# Patient Record
Sex: MALE | Race: WHITE | NOT HISPANIC OR LATINO | Employment: OTHER | URBAN - NONMETROPOLITAN AREA
[De-identification: names, ages, dates, MRNs, and addresses within clinical notes are randomized per-mention and may not be internally consistent; named-entity substitution may affect disease eponyms.]

---

## 2023-01-24 ENCOUNTER — HOSPITAL ENCOUNTER (EMERGENCY)
Facility: HOSPITAL | Age: 78
Discharge: HOME | End: 2023-01-24
Attending: EMERGENCY MEDICINE | Admitting: EMERGENCY MEDICINE

## 2023-01-24 VITALS
BODY MASS INDEX: 25.65 KG/M2 | OXYGEN SATURATION: 98 % | HEIGHT: 72 IN | WEIGHT: 189.38 LBS | HEART RATE: 79 BPM | SYSTOLIC BLOOD PRESSURE: 156 MMHG | DIASTOLIC BLOOD PRESSURE: 84 MMHG | RESPIRATION RATE: 18 BRPM | TEMPERATURE: 96.8 F

## 2023-01-24 DIAGNOSIS — K11.20 PAROTIDITIS: ICD-10-CM

## 2023-01-24 DIAGNOSIS — L50.9 URTICARIA: Primary | ICD-10-CM

## 2023-01-24 RX ORDER — FAMOTIDINE 20 MG/1
20 TABLET, FILM COATED ORAL 2 TIMES DAILY
Qty: 20 TABLET | Refills: 0 | Status: SHIPPED | OUTPATIENT
Start: 2023-01-24

## 2023-01-24 RX ORDER — DIPHENHYDRAMINE HCL 25 MG
25 TABLET ORAL ONCE
Status: COMPLETED | OUTPATIENT
Start: 2023-01-24 | End: 2023-01-24

## 2023-01-24 RX ORDER — DIPHENHYDRAMINE HCL 25 MG
25 TABLET ORAL EVERY 6 HOURS
Qty: 20 TABLET | Refills: 0 | Status: SHIPPED | OUTPATIENT
Start: 2023-01-24

## 2023-01-24 RX ORDER — FAMOTIDINE 20 MG/1
20 TABLET, FILM COATED ORAL ONCE
Status: COMPLETED | OUTPATIENT
Start: 2023-01-24 | End: 2023-01-24

## 2023-01-24 RX ADMIN — DIPHENHYDRAMINE HYDROCHLORIDE 25 MG: 25 TABLET ORAL at 16:56

## 2023-01-24 RX ADMIN — FAMOTIDINE 20 MG: 20 TABLET ORAL at 16:56

## 2023-01-24 NOTE — ED PROVIDER NOTES
History  Chief Complaint   Patient presents with   • Facial Swelling   • Rash     Pt c/o left side facial swelling starting 3 days ago w/rash developing on left side extremities moving to all extremities within past 2 days  Pt seen at other hospital 3 days ago and given abx  Pt c/o worsening sx today  Patient is a 41-year-old male presenting to the emergency department today complaining of itchy rash and left facial swelling, states he was seen at outside hospital yesterday and diagnosed with parotiditis, he is taking Augmentin and his facial swelling has actually improved significantly but reports they did not address the itchy rash that he is having for the past 3 days, he did spend the day at [facility] on Saturday, noted the rash when he woke up on Sunday, he did not spend the night, he did not have any new foods, lotions, detergents, or any other allergen triggers, he denies any shortness of breath, no difficulty swallowing, no history of similar symptoms previously, he has a catch but reports the cat does not have fleas, he has not taken anything for the rash thus far          None       History reviewed  No pertinent past medical history  History reviewed  No pertinent surgical history  History reviewed  No pertinent family history  I have reviewed and agree with the history as documented  E-Cigarette/Vaping   • E-Cigarette Use Never User      E-Cigarette/Vaping Substances     Social History     Tobacco Use   • Smoking status: Never   • Smokeless tobacco: Never   Vaping Use   • Vaping Use: Never used   Substance Use Topics   • Alcohol use: Not Currently   • Drug use: Never       Review of Systems   Constitutional: Negative  HENT: Positive for facial swelling  Eyes: Negative  Respiratory: Negative  Cardiovascular: Negative  Gastrointestinal: Negative  Endocrine: Negative  Genitourinary: Negative  Musculoskeletal: Negative  Skin: Positive for rash  Allergic/Immunologic: Negative  Neurological: Negative  Hematological: Negative  Psychiatric/Behavioral: Negative  Physical Exam  Physical Exam  Constitutional:       Appearance: He is well-developed  HENT:      Head: Normocephalic and atraumatic  Comments: Left facial swelling, left parotid enlarged, airway is patent, no stridor  Eyes:      Conjunctiva/sclera: Conjunctivae normal       Pupils: Pupils are equal, round, and reactive to light  Cardiovascular:      Rate and Rhythm: Normal rate  Pulmonary:      Effort: Pulmonary effort is normal    Abdominal:      Palpations: Abdomen is soft  Musculoskeletal:         General: Normal range of motion  Cervical back: Normal range of motion and neck supple  Skin:     General: Skin is warm and dry  Comments: Erythematous indurated lesions, mainly on the trunk with scattered lesions on the upper and lower extremities, see attached photos for details   Neurological:      Mental Status: He is alert and oriented to person, place, and time                       Vital Signs  ED Triage Vitals [01/24/23 1623]   Temperature Pulse Respirations Blood Pressure SpO2   (!) 96 8 °F (36 °C) 79 18 156/84 98 %      Temp Source Heart Rate Source Patient Position - Orthostatic VS BP Location FiO2 (%)   Tympanic Monitor Lying Right arm --      Pain Score       5           Vitals:    01/24/23 1623   BP: 156/84   Pulse: 79   Patient Position - Orthostatic VS: Lying         Visual Acuity      ED Medications  Medications   famotidine (PEPCID) tablet 20 mg (20 mg Oral Given 1/24/23 1656)   diphenhydrAMINE (BENADRYL) tablet 25 mg (25 mg Oral Given 1/24/23 1656)       Diagnostic Studies  Results Reviewed     None                 No orders to display              Procedures  Procedures         ED Course                                             Medical Decision Making  Patient is a 60-year-old male presenting to the emergency department complaining of persistent itchy diffuse rash, as well as left facial swelling that actually improved today, was seen at outside hospital yesterday and started on Augmentin for parotiditis, has had rash for the past 3 days, states he did call the [place] and they recommended Benadryl, however he wanted rash to be seen in person by a physician for further recommendations, he does report that he wore a new sweater on Saturday, unclear if maybe there was something this water was made of or a chemical on it that may have caused his rash, he was advised to take Pepcid and Benadryl, did discuss the possibility of treatment with steroids such as prednisone, however patient declined stating he is a diabetic and would prefer not to cause hyperglycemia as rash is somewhat manageable at this point in time, I did review records from outside hospital visit yesterday, he did have imaging done that showed left parotiditis, laboratory findings were relatively unremarkable at the time, therefore patient was advised to continue Augmentin as prescribed, was provided with prescription for Pepcid and Benadryl, advise close follow-up with PCP or return if symptoms worsen, patient acknowledges understanding and agreement with this plan    Parotiditis: acute illness or injury  Urticaria: acute illness or injury  Amount and/or Complexity of Data Reviewed  External Data Reviewed: labs, radiology and notes  Risk  OTC drugs  Prescription drug management            Disposition  Final diagnoses:   Urticaria   Parotiditis     Time reflects when diagnosis was documented in both MDM as applicable and the Disposition within this note     Time User Action Codes Description Comment    1/24/2023  4:48 PM Belinda Washington Add [L50 9] Urticaria     1/24/2023  4:48 PM Henrique Wills Add [K11 20] Parotiditis       ED Disposition     ED Disposition   Discharge    Condition   Stable    Date/Time   Tue Jan 24, 2023  4:48 PM    Comment   Romero Decker discharge to home/self care                Follow-up Information    None         Patient's Medications   Discharge Prescriptions    DIPHENHYDRAMINE (BENADRYL) 25 MG TABLET    Take 1 tablet (25 mg total) by mouth every 6 (six) hours       Start Date: 1/24/2023 End Date: --       Order Dose: 25 mg       Quantity: 20 tablet    Refills: 0    FAMOTIDINE (PEPCID) 20 MG TABLET    Take 1 tablet (20 mg total) by mouth 2 (two) times a day       Start Date: 1/24/2023 End Date: --       Order Dose: 20 mg       Quantity: 20 tablet    Refills: 0       No discharge procedures on file      PDMP Review     None          ED Provider  Electronically Signed by           Lei Colindres DO  01/24/23 6493